# Patient Record
Sex: FEMALE | Race: WHITE | NOT HISPANIC OR LATINO | Employment: UNEMPLOYED | URBAN - METROPOLITAN AREA
[De-identification: names, ages, dates, MRNs, and addresses within clinical notes are randomized per-mention and may not be internally consistent; named-entity substitution may affect disease eponyms.]

---

## 2017-02-07 ENCOUNTER — HOSPITAL ENCOUNTER (EMERGENCY)
Facility: HOSPITAL | Age: 22
Discharge: HOME | End: 2017-02-07
Attending: EMERGENCY MEDICINE | Admitting: EMERGENCY MEDICINE

## 2017-02-07 VITALS
TEMPERATURE: 97 F | BODY MASS INDEX: 18.83 KG/M2 | WEIGHT: 120 LBS | OXYGEN SATURATION: 98 % | HEIGHT: 67 IN | SYSTOLIC BLOOD PRESSURE: 124 MMHG | RESPIRATION RATE: 18 BRPM | DIASTOLIC BLOOD PRESSURE: 76 MMHG | HEART RATE: 113 BPM

## 2017-02-07 DIAGNOSIS — Z77.098 CHEMICAL EXPOSURE OF EYE: Primary | ICD-10-CM

## 2017-02-07 PROCEDURE — 99283 EMERGENCY DEPT VISIT LOW MDM: CPT

## 2017-02-07 NOTE — ED PROVIDER NOTES
Subjective   HPI Comments: Patient was working in an art room using copper acid.  She states she got a small amount in the medial canthus of her left eye.  Quickly she irrigated it and then took a shower and irrigated more.  Since that time she's had a little bit of irritation and wanted to come get this checked out.  She denies any visual disturbance and states that the pain is actually improved.  She wears distance vision glasses and recently had an eye exam.  No other complaints.    Patient is a 21 y.o. female presenting with eye problem.   History provided by:  Patient  Eye Problem   Location:  Left eye  Quality:  Dull  Severity:  Mild  Onset quality:  Sudden  Timing:  Intermittent  Progression:  Resolved  Chronicity:  New  Context: chemical exposure    Relieved by:  Flushing  Worsened by:  Nothing  Associated symptoms: no blurred vision, no double vision, no headaches, no scotomas and no vomiting        Review of Systems   Eyes: Negative for blurred vision and double vision.   Gastrointestinal: Negative for vomiting.   Neurological: Negative for headaches.   All other systems reviewed and are negative.      History reviewed. No pertinent past medical history.    No Known Allergies    History reviewed. No pertinent past surgical history.    History reviewed. No pertinent family history.    Social History     Social History   • Marital status: Single     Spouse name: N/A   • Number of children: N/A   • Years of education: N/A     Social History Main Topics   • Smoking status: Never Smoker   • Smokeless tobacco: None   • Alcohol use Yes      Comment: occasional   • Drug use: No   • Sexual activity: Not Asked     Other Topics Concern   • None     Social History Narrative   • None           Objective   Physical Exam   Constitutional: She appears well-developed and well-nourished.   HENT:   Head: Normocephalic and atraumatic.   Eyes: EOM are normal. Pupils are equal, round, and reactive to light.   All of redness to  the medial canthus of the left eye.  It appears that in this area, the bulbar conjunctiva is red.  All the vessels around the eye including the this is normal.  Visual acuity were reviewed by me.   Neurological: She is alert.   Skin: Skin is warm and dry.   Psychiatric: She has a normal mood and affect. Her behavior is normal. Judgment and thought content normal.   Nursing note and vitals reviewed.      Procedures         ED Course  ED Course   Comment By Time   She was irrigated with 500 ML normal saline here.  She had no additional complaints. ALINA Kay 02/07 0313                  [facility]    Final diagnoses:   Chemical exposure of eye            ALINA Kay  02/07/17 0314

## 2018-06-13 ENCOUNTER — OFFICE VISIT (OUTPATIENT)
Dept: FAMILY MEDICINE CLINIC | Facility: CLINIC | Age: 23
End: 2018-06-13

## 2018-06-13 VITALS
OXYGEN SATURATION: 98 % | WEIGHT: 120 LBS | BODY MASS INDEX: 18.83 KG/M2 | HEART RATE: 92 BPM | HEIGHT: 67 IN | SYSTOLIC BLOOD PRESSURE: 98 MMHG | DIASTOLIC BLOOD PRESSURE: 58 MMHG

## 2018-06-13 DIAGNOSIS — F41.9 ANXIETY: Primary | ICD-10-CM

## 2018-06-13 DIAGNOSIS — Z83.2 FAMILY HISTORY OF PROTEIN C DEFICIENCY: ICD-10-CM

## 2018-06-13 DIAGNOSIS — Z83.2 FAMILY HISTORY OF FACTOR V LEIDEN MUTATION: ICD-10-CM

## 2018-06-13 DIAGNOSIS — R53.83 OTHER FATIGUE: ICD-10-CM

## 2018-06-13 LAB
HCT VFR BLDA CALC: 41.2 %
HGB BLDA-MCNC: 13.7 G/DL
MCH, POC: 31
MCHC, POC: 33.2
MCV, POC: 93.5
PLATELET # BLD AUTO: 306 10*3/MM3
PMV BLD: 8.7 FL
RBC, POC: 4.41
RDW, POC: 13.7
WBC # BLD: 9.8 10*3/UL

## 2018-06-13 PROCEDURE — 85027 COMPLETE CBC AUTOMATED: CPT | Performed by: FAMILY MEDICINE

## 2018-06-13 PROCEDURE — 99203 OFFICE O/P NEW LOW 30 MIN: CPT | Performed by: FAMILY MEDICINE

## 2018-06-13 RX ORDER — NORETHINDRONE ACETATE AND ETHINYL ESTRADIOL, ETHINYL ESTRADIOL AND FERROUS FUMARATE 1MG-10(24)
KIT ORAL
COMMUNITY
Start: 2018-05-16 | End: 2018-07-16

## 2018-06-13 NOTE — PROGRESS NOTES
Subjective   Elyssa Santoyo is a 23 y.o. female. Presents today for   Chief Complaint   Patient presents with   • Establish Care   • Tingling       Lower Extremity Issue   This is a new (Reports tingling and numbness in finger tips and toes.) problem. Episode onset: Started end of April 2018. The problem occurs intermittently (Can last for several hours.). The problem has been waxing and waning. Associated symptoms include fatigue and numbness. Pertinent negatives include no abdominal pain, arthralgias (No back pain), chest pain, fever, joint swelling, nausea or weakness. Associated symptoms comments: Hx of anxiety, reports when anxious about worse, but not necessarily associated with having anxiety.. Nothing aggravates the symptoms. Treatments tried: walking around improved numbness in toes. Improvement on treatment: Other than walking around, has not tried anything or had prior work-up.   Anxiety   Presents for initial visit. Episode onset: 1.5 years ago. The problem has been waxing and waning. Symptoms include dizziness, excessive worry, muscle tension, nervous/anxious behavior, palpitations (only with panic attacks) and panic. Patient reports no chest pain, depressed mood, nausea, shortness of breath or suicidal ideas. Primary symptoms comment: Reports moved to [place], feels more anxious when in [place] as alone.. Symptoms occur most days. The symptoms are aggravated by work stress. The quality of sleep is fair. Nighttime awakenings: occasional.     There is no history of anemia, arrhythmia, asthma, bipolar disorder, CAD, CHF, depression, fibromyalgia, hyperthyroidism or suicide attempts. Treatments tried: Last primary suggested zyrtec at bedtime. The treatment provided no relief. Compliance with prior treatments has been good.     Father patient here and has Factor V leiden deficiency and protein C def, needs screened.  No personal history of DVT/VTE.    Review of Systems   Constitutional: Positive for  "fatigue. Negative for fever.   Respiratory: Negative for shortness of breath.    Cardiovascular: Positive for palpitations (only with panic attacks). Negative for chest pain.   Gastrointestinal: Negative for abdominal pain and nausea.   Musculoskeletal: Negative for arthralgias (No back pain) and joint swelling.   Neurological: Positive for dizziness and numbness. Negative for weakness.   Psychiatric/Behavioral: Negative for suicidal ideas. The patient is nervous/anxious.        The following portions of the patient's history were reviewed and updated as appropriate: allergies, current medications, past family history, past medical history, past social history, past surgical history and problem list.    Patient Active Problem List   Diagnosis   • Anxiety       No Known Allergies    No current outpatient prescriptions on file prior to visit.     No current facility-administered medications on file prior to visit.        Objective   Vitals:    06/13/18 1021   BP: 98/58   BP Location: Right arm   Patient Position: Sitting   Cuff Size: Adult   Pulse: 92   SpO2: 98%   Weight: 54.4 kg (120 lb)   Height: 170.2 cm (67\")       Physical Exam   Constitutional: She appears well-developed and well-nourished.   HENT:   Head: Normocephalic and atraumatic.   Neck: Neck supple. No JVD present. No thyromegaly present.   Cardiovascular: Normal rate, regular rhythm and normal heart sounds.  Exam reveals no gallop and no friction rub.    No murmur heard.  Pulmonary/Chest: Effort normal and breath sounds normal. No respiratory distress. She has no wheezes. She has no rales.   Abdominal: Soft. Bowel sounds are normal. She exhibits no distension. There is no tenderness. There is no rebound and no guarding.   Musculoskeletal: She exhibits no edema.   Neurological: She is alert.   Skin: Skin is warm and dry.   Psychiatric: Her behavior is normal. Her mood appears anxious.   Nursing note and vitals reviewed.    CBC ordered and " reviewed.    Assessment/Plan   Elyssa was seen today for establish care and tingling.    Diagnoses and all orders for this visit:    Anxiety    Family history of factor V Leiden mutation  -     Factor 5 Leiden  -     Protein C & S Antigens  -     Protein C & S, Functional    Other fatigue  -     Factor 5 Leiden  -     Protein C & S Antigens  -     Protein C & S, Functional  -     Comprehensive Metabolic Panel  -     TSH  -     Vitamin B12  -     POC CBC    Family history of protein C deficiency  -     Factor 5 Leiden  -     Protein C & S Antigens  -     Protein C & S, Functional    -I d/w tx for anxiety at length and will consider sertraline, warned of increase in anxiety when 1st starts;  Could try atarax prn in beginning for panic attacks;  D/w after doing well, could consider coming off.  She will d/w her Mother than call for Rx if wants.  D/w will need to est with PCP as plans to live in [place] for next 2 years.  -fatigue - check labs as noted above;  Reports fingertip and toe tingling occly, ? b12 def, will check  -Father patient here and has low protein C and factor V leiden mutation, patient will be screened today         -Follow up: 4 weeks       Current Outpatient Prescriptions:   •  LO LOESTRIN FE 1 MG-10 MCG / 10 MCG tablet, , Disp: , Rfl:

## 2018-06-19 LAB
ALBUMIN SERPL-MCNC: 4.4 G/DL (ref 3.5–5.5)
ALBUMIN/GLOB SERPL: 1.5 {RATIO} (ref 1.2–2.2)
ALP SERPL-CCNC: 44 IU/L (ref 39–117)
ALT SERPL-CCNC: 9 IU/L (ref 0–32)
AST SERPL-CCNC: 16 IU/L (ref 0–40)
BILIRUB SERPL-MCNC: 1 MG/DL (ref 0–1.2)
BUN SERPL-MCNC: 10 MG/DL (ref 6–20)
BUN/CREAT SERPL: 14 (ref 9–23)
CALCIUM SERPL-MCNC: 9.5 MG/DL (ref 8.7–10.2)
CHLORIDE SERPL-SCNC: 102 MMOL/L (ref 96–106)
CO2 SERPL-SCNC: 22 MMOL/L (ref 20–29)
CREAT SERPL-MCNC: 0.71 MG/DL (ref 0.57–1)
F5 GENE MUT ANL BLD/T: ABNORMAL
GFR SERPLBLD CREATININE-BSD FMLA CKD-EPI: 120 ML/MIN/1.73
GFR SERPLBLD CREATININE-BSD FMLA CKD-EPI: 139 ML/MIN/1.73
GLOBULIN SER CALC-MCNC: 2.9 G/DL (ref 1.5–4.5)
GLUCOSE SERPL-MCNC: 85 MG/DL (ref 65–99)
POTASSIUM SERPL-SCNC: 4.9 MMOL/L (ref 3.5–5.2)
PROT C ACT/NOR PPP: 137 % (ref 73–180)
PROT C AG ACT/NOR PPP IA: 116 % (ref 60–150)
PROT S ACT/NOR PPP: 59 % (ref 63–140)
PROT S AG ACT/NOR PPP IA: 36 % (ref 60–150)
PROT S FREE AG ACT/NOR PPP IA: 71 % (ref 57–157)
PROT SERPL-MCNC: 7.3 G/DL (ref 6–8.5)
SODIUM SERPL-SCNC: 138 MMOL/L (ref 134–144)
TSH SERPL DL<=0.005 MIU/L-ACNC: 2.05 UIU/ML (ref 0.45–4.5)
VIT B12 SERPL-MCNC: 325 PG/ML (ref 232–1245)

## 2018-06-20 DIAGNOSIS — D68.51 FACTOR 5 LEIDEN MUTATION, HETEROZYGOUS (HCC): Primary | ICD-10-CM

## 2018-06-20 NOTE — PROGRESS NOTES
Call results to patient.  A patient is + for factor V leiden mutation and protein S deficiency (mild).  I am going to refer to hematology for counseling on future thrombotic risk and long-term planning.  Her Father has had recurrent blood clots and Factor V Leiden + as well.  ALso to give advice on continuing OCP as places at risk blood clots.  Rest of labs are normal.

## 2018-07-16 ENCOUNTER — OFFICE VISIT (OUTPATIENT)
Dept: FAMILY MEDICINE CLINIC | Facility: CLINIC | Age: 23
End: 2018-07-16

## 2018-07-16 VITALS
HEIGHT: 67 IN | DIASTOLIC BLOOD PRESSURE: 62 MMHG | TEMPERATURE: 98.4 F | BODY MASS INDEX: 18.83 KG/M2 | HEART RATE: 80 BPM | OXYGEN SATURATION: 98 % | SYSTOLIC BLOOD PRESSURE: 102 MMHG | WEIGHT: 120 LBS

## 2018-07-16 DIAGNOSIS — D68.51 FACTOR 5 LEIDEN MUTATION, HETEROZYGOUS (HCC): Primary | ICD-10-CM

## 2018-07-16 PROCEDURE — 99213 OFFICE O/P EST LOW 20 MIN: CPT | Performed by: FAMILY MEDICINE

## 2018-07-16 NOTE — PROGRESS NOTES
"Subjective   Elyssa Santoyo is a 23 y.o. female. Presents today for   Chief Complaint   Patient presents with   • Follow-up     pt here for 6 week follow up appt, she would like to discuss her lab results.       History of Present Illness  Patient was heterozygous + for Factor V leiden.  She has never had prior DVT/VTE.  She was on OCP, but has come off now since testing back.  Currently denies cp/soa;  No palpitations;  No calf or leg pain;  No lower extremity swelling.  She has upcoming flight to [place], which is 17 hours.  She has made this trip several times in past.  Her Father is a patient here who also was + for Factor V Leiden, he has had 2 unprovoked DVTs and currently on life long anticoagulation.  Her sister was just tested and negative.    Review of Systems   Respiratory: Negative for shortness of breath.    Cardiovascular: Negative for chest pain, palpitations and leg swelling.   Neurological: Negative for syncope.       Patient Active Problem List   Diagnosis   • Anxiety   • Factor 5 Leiden mutation, heterozygous (CMS/[facility])       Social History     Social History   • Marital status: Single   • Years of education: College     Occupational History   •  Unemployed     Social History Main Topics   • Smoking status: Never Smoker   • Smokeless tobacco: Never Used   • Alcohol use 1.8 - 2.4 oz/week     3 Glasses of wine per week      Comment: occasional   • Drug use: No     Other Topics Concern   • Not on file       No Known Allergies    Current Outpatient Prescriptions on File Prior to Visit   Medication Sig Dispense Refill   • [DISCONTINUED] LO LOESTRIN FE 1 MG-10 MCG / 10 MCG tablet        No current facility-administered medications on file prior to visit.        Objective   Vitals:    07/16/18 1322   BP: 102/62   BP Location: Right arm   Patient Position: Sitting   Cuff Size: Adult   Pulse: 80   Temp: 98.4 °F (36.9 °C)   TempSrc: Oral   SpO2: 98%   Weight: 54.4 kg (120 lb)   Height: 170.2 cm (67.01\") "       Physical Exam   Constitutional: She appears well-developed and well-nourished.   HENT:   Head: Normocephalic and atraumatic.   Neck: Neck supple. No JVD present. No thyromegaly present.   Cardiovascular: Normal rate, regular rhythm and normal heart sounds.  Exam reveals no gallop and no friction rub.    No murmur heard.  Pulmonary/Chest: Effort normal and breath sounds normal. No respiratory distress. She has no wheezes. She has no rales.   Abdominal: Soft. Bowel sounds are normal. She exhibits no distension. There is no tenderness. There is no rebound and no guarding.   Musculoskeletal: She exhibits no edema.   Neurological: She is alert.   Skin: Skin is warm and dry.   Psychiatric: She has a normal mood and affect. Her behavior is normal.   Nursing note and vitals reviewed.      Assessment/Plan   Elyssa was seen today for follow-up.    Diagnoses and all orders for this visit:    Factor 5 Leiden mutation, heterozygous (CMS/HCC)    -I gave patient hand out on Factor 5 Leiden and educational information.  Recommend stay off OCP (estrogen products).  Try to move around on plane as able, wear compression hose as precautionary.  I did refer to hematology so could review with patient, future risks and better answer her questions.  -She saw me for anxiety, but relates at this time doing okay and doesn't desire treatment.       -Follow up: Prn - RTC if worse or no improvement.

## 2018-07-30 ENCOUNTER — CONSULT (OUTPATIENT)
Dept: ONCOLOGY | Facility: CLINIC | Age: 23
End: 2018-07-30

## 2018-07-30 ENCOUNTER — LAB (OUTPATIENT)
Dept: LAB | Facility: HOSPITAL | Age: 23
End: 2018-07-30

## 2018-07-30 VITALS
HEIGHT: 66 IN | OXYGEN SATURATION: 100 % | HEART RATE: 87 BPM | RESPIRATION RATE: 16 BRPM | BODY MASS INDEX: 19.35 KG/M2 | TEMPERATURE: 97.8 F | WEIGHT: 120.4 LBS | SYSTOLIC BLOOD PRESSURE: 102 MMHG | DIASTOLIC BLOOD PRESSURE: 64 MMHG

## 2018-07-30 DIAGNOSIS — D68.51 FACTOR 5 LEIDEN MUTATION, HETEROZYGOUS (HCC): Primary | ICD-10-CM

## 2018-07-30 LAB
ALBUMIN SERPL-MCNC: 4.1 G/DL (ref 3.5–5.2)
ALBUMIN/GLOB SERPL: 1.3 G/DL (ref 1.1–2.4)
ALP SERPL-CCNC: 50 U/L (ref 38–116)
ALT SERPL W P-5'-P-CCNC: 18 U/L (ref 0–33)
ANION GAP SERPL CALCULATED.3IONS-SCNC: 12.5 MMOL/L
AST SERPL-CCNC: 27 U/L (ref 0–32)
BASOPHILS # BLD AUTO: 0.05 10*3/MM3 (ref 0–0.1)
BASOPHILS NFR BLD AUTO: 0.5 % (ref 0–1.1)
BILIRUB SERPL-MCNC: 1.5 MG/DL (ref 0.1–1.2)
BUN BLD-MCNC: 12 MG/DL (ref 6–20)
BUN/CREAT SERPL: 19.7 (ref 7.3–30)
CALCIUM SPEC-SCNC: 9.2 MG/DL (ref 8.5–10.2)
CHLORIDE SERPL-SCNC: 103 MMOL/L (ref 98–107)
CO2 SERPL-SCNC: 24.5 MMOL/L (ref 22–29)
CREAT BLD-MCNC: 0.61 MG/DL (ref 0.6–1.1)
DEPRECATED RDW RBC AUTO: 44.3 FL (ref 37–49)
EOSINOPHIL # BLD AUTO: 0.19 10*3/MM3 (ref 0–0.36)
EOSINOPHIL NFR BLD AUTO: 2 % (ref 1–5)
ERYTHROCYTE [DISTWIDTH] IN BLOOD BY AUTOMATED COUNT: 13.1 % (ref 11.7–14.5)
GFR SERPL CREATININE-BSD FRML MDRD: 122 ML/MIN/1.73
GLOBULIN UR ELPH-MCNC: 3.1 GM/DL (ref 1.8–3.5)
GLUCOSE BLD-MCNC: 85 MG/DL (ref 74–124)
HCT VFR BLD AUTO: 42.2 % (ref 34–45)
HGB BLD-MCNC: 13.8 G/DL (ref 11.5–14.9)
IMM GRANULOCYTES # BLD: 0.03 10*3/MM3 (ref 0–0.03)
IMM GRANULOCYTES NFR BLD: 0.3 % (ref 0–0.5)
LYMPHOCYTES # BLD AUTO: 4.4 10*3/MM3 (ref 1–3.5)
LYMPHOCYTES NFR BLD AUTO: 46.7 % (ref 20–49)
MCH RBC QN AUTO: 30.4 PG (ref 27–33)
MCHC RBC AUTO-ENTMCNC: 32.7 G/DL (ref 32–35)
MCV RBC AUTO: 93 FL (ref 83–97)
MONOCYTES # BLD AUTO: 1.07 10*3/MM3 (ref 0.25–0.8)
MONOCYTES NFR BLD AUTO: 11.4 % (ref 4–12)
NEUTROPHILS # BLD AUTO: 3.68 10*3/MM3 (ref 1.5–7)
NEUTROPHILS NFR BLD AUTO: 39.1 % (ref 39–75)
NRBC BLD MANUAL-RTO: 0 /100 WBC (ref 0–0)
PLATELET # BLD AUTO: 240 10*3/MM3 (ref 150–375)
PMV BLD AUTO: 10.7 FL (ref 8.9–12.1)
POTASSIUM BLD-SCNC: 3.7 MMOL/L (ref 3.5–4.7)
PROT SERPL-MCNC: 7.2 G/DL (ref 6.3–8)
RBC # BLD AUTO: 4.54 10*6/MM3 (ref 3.9–5)
SODIUM BLD-SCNC: 140 MMOL/L (ref 134–145)
WBC NRBC COR # BLD: 9.42 10*3/MM3 (ref 4–10)

## 2018-07-30 PROCEDURE — 36416 COLLJ CAPILLARY BLOOD SPEC: CPT | Performed by: INTERNAL MEDICINE

## 2018-07-30 PROCEDURE — 36415 COLL VENOUS BLD VENIPUNCTURE: CPT | Performed by: INTERNAL MEDICINE

## 2018-07-30 PROCEDURE — 99204 OFFICE O/P NEW MOD 45 MIN: CPT | Performed by: INTERNAL MEDICINE

## 2018-07-30 PROCEDURE — 80053 COMPREHEN METABOLIC PANEL: CPT | Performed by: INTERNAL MEDICINE

## 2018-07-30 PROCEDURE — 85306 CLOT INHIBIT PROT S FREE: CPT | Performed by: INTERNAL MEDICINE

## 2018-07-30 PROCEDURE — 85025 COMPLETE CBC W/AUTO DIFF WBC: CPT | Performed by: INTERNAL MEDICINE

## 2018-07-30 NOTE — PROGRESS NOTES
Subjective feels well, discussed findings for hypercoagulable state    REASON FOR CONSULTATION:  Factor V 5 Leiden mutation  Provide an opinion on any further workup or treatment                             REQUESTING PHYSICIAN:  Raji Canales DO    RECORDS OBTAINED:  Records of the patients history including those obtained from the referring provider were reviewed and summarized in detail.    HISTORY OF PRESENT ILLNESS:  The patient is a 23 y.o. year old female who is here for an opinion about the above issue.    History of Present Illness         The patient's 23-year-old female resting see with a recently recognized heterozygosity for factor V Leiden.  Previous history includes assessment for numbness in fingers and toes in April, anxiety and consideration of using a trial of Zoloft.  It is become notable that her father, also followed in our practice, has a history of factor V Leiden mutation.  The patient's father assessment included a hypercoagulable assessment has since been ongoing. This, unfortunately, reflects his continued warfarin use with low protein S free levels at 52%, total protein S at 83%, protein S functional low at 43% and MTHFR which reveals heterozygosity for the C677T variant (1 copy) AT3 activity level 115% and AT3 antigen at 112%. Protein C is similarly reduced consistent with warfarin use. The factor V Leiden assessment   r  eveals a single mutation (R506q) prothrombin gene mutation negative is negative. It was also recognized that the patient has elevated cholesterol at 233 with triglycerides at 173.  This patient's assessment obtained June 13 was significant for single R506Q mutation (heterozygosity) for factor V Leiden as well, protein S antigen of 36, protein S antigen free of 71, protein S functional of 59.  Additional tests included TSH of 2.05, B12 of 325.  As result the patient's positive findings for factor V Leiden and apparent protein S deficiency she is now seen for  consultation.   In discussing risk factors for hypercoagulable state the patient fortunately has never had thrombosis nor injury to extremity.  She did use birth control pills for approximately 7 years and stopped them when she was found to be positive for heterozygosity for factor V Leiden.  This was approximately 2 weeks ago and in the last week or so she's had placement of an IUD nonhormonal.  This she is also tolerated fairly well.  Finally she indicates that she does travel rather extensively in several trips to [place] on at least 4 occasions which is 20 hour plus trip.  Finally her sister was tested and found to have some variability as the patient herself with with protein S though her sister was negative for factor V Leiden Leiden mutation.  The patient does plan in September another trip to [place] will likely be staying there possibly for several years.    Past Medical History:   Diagnosis Date   • Anxiety    • Factor V Leiden mutation (CMS/[facility]) 2018   • GERD (gastroesophageal reflux disease)    • H/O Pink eye    • History of migraines         Past Surgical History:   Procedure Laterality Date   • NO PAST SURGERIES          No current outpatient prescriptions on file prior to visit.     No current facility-administered medications on file prior to visit.         ALLERGIES:  No Known Allergies     Social History     Social History   • Marital status: Single   • Years of education: College     Occupational History   •  Unemployed     Social History Main Topics   • Smoking status: Never Smoker   • Smokeless tobacco: Never Used   • Alcohol use 1.8 - 2.4 oz/week     3 Glasses of wine per week      Comment: occasional   • Drug use: No     Other Topics Concern   • Not on file        Family History   Problem Relation Age of Onset   • Factor V Leiden deficiency Father    • Deep vein thrombosis Father    • Pulmonary embolism Father    • Arrhythmia Father    • Cancer Maternal Grandfather         Review of  "Systems   Constitutional: Negative.    HENT: Negative.    Eyes: Negative.    Respiratory: Negative.    Cardiovascular: Negative.    Gastrointestinal: Positive for abdominal pain.   Endocrine: Negative.    Genitourinary: Negative.    Musculoskeletal: Negative.    Skin:        Positive for periodic pruritus   Allergic/Immunologic: Negative.    Neurological: Negative.    Hematological: Negative.    Psychiatric/Behavioral: Negative.        Objective     Vitals:    07/30/18 1018   BP: 102/64   Pulse: 87   Resp: 16   Temp: 97.8 °F (36.6 °C)   TempSrc: Oral   SpO2: 100%   Weight: 54.6 kg (120 lb 6.4 oz)   Height: 168.5 cm (66.34\")   PainSc: 0-No pain  Comment: Headaches x1 week.     Current Status 7/30/2018   ECOG score 0       Physical Exam   Constitutional: She is oriented to person, place, and time. She appears well-developed and well-nourished.   HENT:   Head: Normocephalic and atraumatic.   Eyes: Pupils are equal, round, and reactive to light. Conjunctivae and EOM are normal.   Neck: Normal range of motion.   Cardiovascular: Normal rate, regular rhythm, normal heart sounds and intact distal pulses.    Pulmonary/Chest: Effort normal and breath sounds normal.   Abdominal: Soft. Bowel sounds are normal.   Musculoskeletal: Normal range of motion.   Neurological: She is alert and oriented to person, place, and time.   Skin: Skin is warm and dry.   Psychiatric: She has a normal mood and affect. Her behavior is normal. Judgment and thought content normal.        RECENT LABS:  Hematology WBC   Date Value Ref Range Status   07/30/2018 9.42 4.00 - 10.00 10*3/mm3 Final     RBC   Date Value Ref Range Status   07/30/2018 4.54 3.90 - 5.00 10*6/mm3 Final   06/13/2018 4.41  Final     Hemoglobin   Date Value Ref Range Status   07/30/2018 13.8 11.5 - 14.9 g/dL Final     Hematocrit   Date Value Ref Range Status   07/30/2018 42.2 34.0 - 45.0 % Final     Platelets   Date Value Ref Range Status   07/30/2018 240 150 - 375 10*3/mm3 Final    "       Assessment/Plan        23-year-old female with little past medical history except for anxiety with recent findings of heterozygosity for factor V Leiden.  The patient's father is followed in our practice with this finding as well as finding heterozygosity for MTHFR, mildly elevated homocysteine and variability protein C&S thought to be related to his warfarin use.  The patient's sister was also found to have mildly low levels of protein S as well though off of any warfarin.      The patient herself has been found to have low levels of protein S as well as heterozygosity factor V 5 Leiden and did not, unfortunately, develop thrombosis despite being on BCPs for many years and also having extremely long airplane trips during which she was somewhat immobilized but did walk, as much as possible, on the plane when she noticed aching in her legs.  We have discussed these findings as well as spoken to her sister and father during the visit.  At this point we plan:  *Recheck Protein  S levels both total, free and functional  *Plasma homocystine level and CMP  *Follow-up visit in approximately 2 weeks with the patient planning a prolonged trip in September  *No hormonal birth control can be recommended from this point  *Prophylaxis with an agent such as Xarelto 10 mg prior to her prolonged airplane trips is being considered.

## 2018-07-31 LAB
HCYS SERPL-SCNC: 13.6 UMOL/L (ref 0–15)
PROT S FREE PPP-ACNC: 73 % (ref 57–157)
PROT S PPP-ACNC: 46 % (ref 60–150)

## 2018-08-03 LAB — PROT S ACT/NOR PPP: 58 % (ref 70–127)

## 2018-08-14 ENCOUNTER — OFFICE VISIT (OUTPATIENT)
Dept: ONCOLOGY | Facility: CLINIC | Age: 23
End: 2018-08-14

## 2018-08-14 ENCOUNTER — LAB (OUTPATIENT)
Dept: LAB | Facility: HOSPITAL | Age: 23
End: 2018-08-14

## 2018-08-14 VITALS
DIASTOLIC BLOOD PRESSURE: 70 MMHG | HEART RATE: 79 BPM | OXYGEN SATURATION: 100 % | TEMPERATURE: 98.2 F | WEIGHT: 118.2 LBS | BODY MASS INDEX: 18.99 KG/M2 | HEIGHT: 66 IN | RESPIRATION RATE: 12 BRPM | SYSTOLIC BLOOD PRESSURE: 104 MMHG

## 2018-08-14 DIAGNOSIS — D68.51 FACTOR 5 LEIDEN MUTATION, HETEROZYGOUS (HCC): Primary | ICD-10-CM

## 2018-08-14 LAB
BASOPHILS # BLD AUTO: 0.03 10*3/MM3 (ref 0–0.1)
BASOPHILS NFR BLD AUTO: 0.4 % (ref 0–1.1)
DEPRECATED RDW RBC AUTO: 44.9 FL (ref 37–49)
EOSINOPHIL # BLD AUTO: 0.23 10*3/MM3 (ref 0–0.36)
EOSINOPHIL NFR BLD AUTO: 3.4 % (ref 1–5)
ERYTHROCYTE [DISTWIDTH] IN BLOOD BY AUTOMATED COUNT: 13.3 % (ref 11.7–14.5)
HCT VFR BLD AUTO: 42.6 % (ref 34–45)
HGB BLD-MCNC: 14.2 G/DL (ref 11.5–14.9)
IMM GRANULOCYTES # BLD: 0.03 10*3/MM3 (ref 0–0.03)
IMM GRANULOCYTES NFR BLD: 0.4 % (ref 0–0.5)
LYMPHOCYTES # BLD AUTO: 3.55 10*3/MM3 (ref 1–3.5)
LYMPHOCYTES NFR BLD AUTO: 52.4 % (ref 20–49)
MCH RBC QN AUTO: 30.7 PG (ref 27–33)
MCHC RBC AUTO-ENTMCNC: 33.3 G/DL (ref 32–35)
MCV RBC AUTO: 92 FL (ref 83–97)
MONOCYTES # BLD AUTO: 0.66 10*3/MM3 (ref 0.25–0.8)
MONOCYTES NFR BLD AUTO: 9.7 % (ref 4–12)
NEUTROPHILS # BLD AUTO: 2.28 10*3/MM3 (ref 1.5–7)
NEUTROPHILS NFR BLD AUTO: 33.7 % (ref 39–75)
NRBC BLD MANUAL-RTO: 0 /100 WBC (ref 0–0)
PLATELET # BLD AUTO: 313 10*3/MM3 (ref 150–375)
PMV BLD AUTO: 11 FL (ref 8.9–12.1)
RBC # BLD AUTO: 4.63 10*6/MM3 (ref 3.9–5)
WBC NRBC COR # BLD: 6.78 10*3/MM3 (ref 4–10)

## 2018-08-14 PROCEDURE — 85025 COMPLETE CBC W/AUTO DIFF WBC: CPT | Performed by: INTERNAL MEDICINE

## 2018-08-14 PROCEDURE — 36415 COLL VENOUS BLD VENIPUNCTURE: CPT | Performed by: INTERNAL MEDICINE

## 2018-08-14 PROCEDURE — 99214 OFFICE O/P EST MOD 30 MIN: CPT | Performed by: INTERNAL MEDICINE

## 2018-08-14 NOTE — PROGRESS NOTES
Subjective feels well, discussed findings for hypercoagulable state    REASON FOR CONSULTATION:  Factor V 5 Leiden mutation  Provide an opinion on any further workup or treatment                             REQUESTING PHYSICIAN:  Raji Canales DO        History of Present Illness         The patient's 23-year-old female resting see with a recently recognized heterozygosity for factor V Leiden.  Previous history includes assessment for numbness in fingers and toes in April, anxiety and consideration of using a trial of Zoloft.  It is become notable that her father, also followed in our practice, has a history of factor V Leiden mutation.  The patient's father assessment included a hypercoagulable assessment has since been ongoing. This, unfortunately, reflects his continued warfarin use with low protein S free levels at 52%, total protein S at 83%, protein S functional low at 43% and MTHFR which reveals heterozygosity for the C677T variant (1 copy) AT3 activity level 115% and AT3 antigen at 112%. Protein C is similarly reduced consistent with warfarin use. The factor V Leiden assessment   r  eveals a single mutation (R506q) prothrombin gene mutation negative is negative. It was also recognized that the patient has elevated cholesterol at 233 with triglycerides at 173.  This patient's assessment obtained June 13 was significant for single R506Q mutation (heterozygosity) for factor V Leiden as well, protein S antigen of 36, protein S antigen free of 71, protein S functional of 59.  Additional tests included TSH of 2.05, B12 of 325.  As result the patient's positive findings for factor V Leiden and apparent protein S deficiency she is now seen for consultation.   In discussing risk factors for hypercoagulable state the patient fortunately has never had thrombosis nor injury to extremity.  She did use birth control pills for approximately 7 years and stopped them when she was found to be positive for heterozygosity  for factor V Leiden.  This was approximately 2 weeks ago and in the last week or so she's had placement of an IUD nonhormonal.  This she is also tolerated fairly well.  Finally she indicates that she does travel rather extensively in several trips to [place] on at least 4 occasions which is 20 hour plus trip.  Finally her sister was tested and found to have some variability as the patient herself with with protein S though her sister was negative for factor V Leiden Leiden mutation.  The patient does plan in September another trip to [place] will likely be staying there possibly for several years.      The patient's subsequent testing included a homocysteine of 13.6, protein S functional 58 (%), protein S antigen total of 46 (60-1 50%), protein S antigen free of 73% (%).  We have discussed at these levels do not appear to be terribly significant but at least part some concern in that prophylaxis could still be helpful for prolonged trips.                  Diagnosis Date   • Anxiety    • Factor V Leiden mutation (CMS/HCC) 2018   • GERD (gastroesophageal reflux disease)    • H/O foreign travel 01/2018    [place], [place]   • H/O Pink eye    • History of migraines         Past Surgical History:   Procedure Laterality Date   • NO PAST SURGERIES          Current Outpatient Prescriptions on File Prior to Visit   Medication Sig Dispense Refill   • Multiple Vitamins-Minerals (MULTIVITAMIN ADULTS PO) Take  by mouth.       No current facility-administered medications on file prior to visit.         ALLERGIES:  No Known Allergies     Social History     Social History   • Marital status: Single   • Years of education: College     Occupational History   •  [employer]     Social History Main Topics   • Smoking status: Never Smoker   • Smokeless tobacco: Never Used   • Alcohol use 1.8 - 2.4 oz/week     3 Glasses of wine per week      Comment: occasional   • Drug use: No     Other  Topics Concern   • Not on file        Family History   Problem Relation Age of Onset   • Factor V Leiden deficiency Father    • Deep vein thrombosis Father    • Pulmonary embolism Father    • Arrhythmia Father    • Cancer Maternal Grandfather         Review of Systems   Constitutional: Negative.    HENT: Negative.    Eyes: Negative.    Respiratory: Negative.    Cardiovascular: Negative.    Gastrointestinal: Positive for abdominal pain.   Endocrine: Negative.    Genitourinary: Negative.    Musculoskeletal: Negative.    Skin:        Positive for periodic pruritus   Allergic/Immunologic: Negative.    Neurological: Negative.    Hematological: Negative.    Psychiatric/Behavioral: Negative.        Objective     There were no vitals filed for this visit.  Current Status 7/30/2018   ECOG score 0       Physical Exam   Constitutional: She is oriented to person, place, and time. She appears well-developed and well-nourished.   HENT:   Head: Normocephalic and atraumatic.   Eyes: Pupils are equal, round, and reactive to light. Conjunctivae and EOM are normal.   Neck: Normal range of motion.   Cardiovascular: Normal rate, regular rhythm, normal heart sounds and intact distal pulses.    Pulmonary/Chest: Effort normal and breath sounds normal.   Abdominal: Soft. Bowel sounds are normal.   Musculoskeletal: Normal range of motion.   Neurological: She is alert and oriented to person, place, and time.   Skin: Skin is warm and dry.   Psychiatric: She has a normal mood and affect. Her behavior is normal. Judgment and thought content normal.        RECENT LABS:  Hematology WBC   Date Value Ref Range Status   07/30/2018 9.42 4.00 - 10.00 10*3/mm3 Final     RBC   Date Value Ref Range Status   07/30/2018 4.54 3.90 - 5.00 10*6/mm3 Final   06/13/2018 4.41  Final     Hemoglobin   Date Value Ref Range Status   07/30/2018 13.8 11.5 - 14.9 g/dL Final     Hematocrit   Date Value Ref Range Status   07/30/2018 42.2 34.0 - 45.0 % Final     Platelets  "  Date Value Ref Range Status   07/30/2018 240 150 - 375 10*3/mm3 Final          Assessment/Plan        23-year-old female with little past medical history except for anxiety with recent findings of heterozygosity for factor V Leiden.  The patient's father is followed in our practice with this finding as well as finding heterozygosity for MTHFR, mildly elevated homocysteine and variability protein C&S thought to be related to his warfarin use.  The patient's sister was also found to have mildly low levels of protein S as well though off of any warfarin.      The patient herself has been found to have low levels of protein S as well as heterozygosity factor V 5 Leiden and did not, unfortunately, develop thrombosis despite being on BCPs for many years and also having extremely long airplane trips during which she was somewhat immobilized but did walk, as much as possible, on the plane when she noticed aching in her legs.  We have discussed these findings as well as spoken to her sister and father during the visit.  At this point we obtained protein S levels, plasma homocystine and now see her back in follow-up with the above results.  She still plans a prolonged trip to [place] and likely residing there for several years.  We have discussed prophylaxis for prolonged airplane trips or even prolonged car trips of more than 2 hours treated with prophylactic doses of Xarelto-10 mg.  Plan:  *Samples of Xarelto 10 mg tablets provided for patient.  The use of this will be very infrequent but prior to prolonged airline and car trips of greater than 2 hours when she cannot otherwise stop and walk for a brief period of time.  She will take the sample tablet 30 minutes prior to the trip beginning  *Compression stockings-equivalent to NATALIE to be used on all prolonged trips  *Patient aware of the use of prophylaxis perioperatively but will contact us as needed for questions through her family or through\" my chart\"   *She is " advised that she contact us anytime she needs to and/or I would see her if she is in town visiting.

## 2018-09-10 ENCOUNTER — TELEPHONE (OUTPATIENT)
Dept: ONCOLOGY | Facility: HOSPITAL | Age: 23
End: 2018-09-10

## 2018-09-10 NOTE — TELEPHONE ENCOUNTER
----- Message from Korin Hernandez sent at 9/10/2018  4:53 PM EDT -----  Contact: [phone]  Pt calling to see if ok for her to take dramamine with Xarelto?

## 2018-09-11 ENCOUNTER — OFFICE VISIT (OUTPATIENT)
Dept: FAMILY MEDICINE CLINIC | Facility: CLINIC | Age: 23
End: 2018-09-11

## 2018-09-11 VITALS
OXYGEN SATURATION: 100 % | HEIGHT: 66 IN | SYSTOLIC BLOOD PRESSURE: 118 MMHG | TEMPERATURE: 98.2 F | BODY MASS INDEX: 18.48 KG/M2 | DIASTOLIC BLOOD PRESSURE: 64 MMHG | HEART RATE: 112 BPM | WEIGHT: 115 LBS

## 2018-09-11 DIAGNOSIS — J01.10 ACUTE NON-RECURRENT FRONTAL SINUSITIS: Primary | ICD-10-CM

## 2018-09-11 PROCEDURE — 99213 OFFICE O/P EST LOW 20 MIN: CPT | Performed by: NURSE PRACTITIONER

## 2018-09-11 RX ORDER — AMOXICILLIN 500 MG/1
1000 CAPSULE ORAL 2 TIMES DAILY
Qty: 28 CAPSULE | Refills: 0 | Status: SHIPPED | OUTPATIENT
Start: 2018-09-11 | End: 2019-03-08

## 2018-09-11 NOTE — PROGRESS NOTES
"Subjective   Elyssa Santoyo is a 23 y.o. female who presents c/o sore throat, nasal congestion, pressure below eyes, headache x 2 days. Has 26 hour flight scheduled for tomorrow and worried needs an antibiotic.     History of Present Illness   Took aleve cold and sinus, some help with symptoms. Also using flonase to address symptoms. Does not get many sinus infections, perhaps 1/yr  The following portions of the patient's history were reviewed and updated as appropriate: allergies, current medications, past family history, past medical history, past social history, past surgical history and problem list.    Review of Systems   Constitutional: Negative for chills and fever.   HENT: Positive for congestion, ear pain, postnasal drip, sinus pressure and sore throat.    Respiratory: Positive for cough.    Cardiovascular: Negative.    Gastrointestinal: Negative.    Neurological: Positive for headache.   Psychiatric/Behavioral: Negative.      /64   Pulse 112   Temp 98.2 °F (36.8 °C) (Oral)   Ht 168.5 cm (66.34\")   Wt 52.2 kg (115 lb)   SpO2 100%   BMI 18.37 kg/m²     Objective   Physical Exam   Constitutional: She is oriented to person, place, and time.   HENT:   Right Ear: A middle ear effusion is present.   Left Ear: A middle ear effusion is present.   Nose: Mucosal edema and rhinorrhea present. Right sinus exhibits maxillary sinus tenderness. Left sinus exhibits maxillary sinus tenderness.   Mouth/Throat: Uvula is midline and oropharynx is clear and moist.   Neck: Normal range of motion. Neck supple. No tracheal deviation present. No thyromegaly present.   Cardiovascular: Normal rate, regular rhythm and normal heart sounds.    Pulmonary/Chest: Effort normal and breath sounds normal.   Lymphadenopathy:     She has no cervical adenopathy.   Neurological: She is alert and oriented to person, place, and time.   Skin: Skin is warm and dry. Capillary refill takes less than 2 seconds.   Psychiatric: She has a normal " mood and affect. Her behavior is normal. Judgment and thought content normal.   Nursing note and vitals reviewed.    Assessment/Plan   Problems Addressed this Visit     None      Visit Diagnoses     Acute non-recurrent frontal sinusitis    -  Primary        Will start amoxil in 24 hours if not improved. Continue symptom controllers. FU if not settling.

## 2019-03-08 ENCOUNTER — OFFICE VISIT (OUTPATIENT)
Dept: FAMILY MEDICINE CLINIC | Facility: CLINIC | Age: 24
End: 2019-03-08

## 2019-03-08 VITALS
SYSTOLIC BLOOD PRESSURE: 100 MMHG | HEART RATE: 77 BPM | TEMPERATURE: 98.3 F | OXYGEN SATURATION: 98 % | WEIGHT: 122 LBS | BODY MASS INDEX: 19.49 KG/M2 | DIASTOLIC BLOOD PRESSURE: 60 MMHG

## 2019-03-08 DIAGNOSIS — M26.629 TMJ SYNDROME: Primary | ICD-10-CM

## 2019-03-08 DIAGNOSIS — M26.629 TMJ SYNDROME: ICD-10-CM

## 2019-03-08 PROCEDURE — 99213 OFFICE O/P EST LOW 20 MIN: CPT | Performed by: FAMILY MEDICINE

## 2019-03-08 RX ORDER — NAPROXEN 500 MG/1
500 TABLET ORAL 2 TIMES DAILY WITH MEALS
Qty: 60 TABLET | Refills: 1 | Status: SHIPPED | OUTPATIENT
Start: 2019-03-08 | End: 2019-03-08 | Stop reason: SDUPTHER

## 2019-03-08 RX ORDER — NAPROXEN 500 MG/1
TABLET ORAL
Qty: 60 TABLET | Refills: 1 | Status: SHIPPED | OUTPATIENT
Start: 2019-03-08 | End: 2019-11-25

## 2019-03-08 NOTE — PROGRESS NOTES
Subjective   Elyssa Santoyo is a 23 y.o. female. Presents today for   Chief Complaint   Patient presents with   • Follow-up     saw dentist yesterday and has a sore place behind her lt ear x 1 year off and on.       Jaw Pain   This is a chronic problem. The current episode started more than 1 year ago. The problem occurs intermittently. The problem has been waxing and waning. Associated symptoms include arthralgias. Pertinent negatives include no joint swelling. Associated symptoms comments: No swollen lymph nodes, just pain near tragus/angle of mandible;  Has popping and clicking at time.. Nothing aggravates the symptoms. She has tried nothing for the symptoms. The treatment provided no relief (Went to dentists x2 adn told teeth normal.).       Review of Systems   Musculoskeletal: Positive for arthralgias. Negative for joint swelling.       Patient Active Problem List   Diagnosis   • Anxiety   • Factor 5 Leiden mutation, heterozygous (CMS/HCC)       Social History     Socioeconomic History   • Marital status: Single     Spouse name: Not on file   • Number of children: Not on file   • Years of education: College   • Highest education level: Not on file   Occupational History   • Occupation: Social Media Manager     Employer: [employer]   Tobacco Use   • Smoking status: Never Smoker   • Smokeless tobacco: Never Used   Substance and Sexual Activity   • Alcohol use: Yes     Alcohol/week: 1.8 - 2.4 oz     Types: 3 Glasses of wine per week     Comment: occasional   • Drug use: No       No Known Allergies    Current Outpatient Medications on File Prior to Visit   Medication Sig Dispense Refill   • Multiple Vitamins-Minerals (MULTIVITAMIN ADULTS PO) Take  by mouth.     • rivaroxaban (XARELTO) 20 MG tablet Take 20 mg by mouth Daily. For use only when flying     • [DISCONTINUED] amoxicillin (AMOXIL) 500 MG capsule Take 2 capsules by mouth 2 (Two) Times a Day. 28 capsule 0     No current facility-administered medications  on file prior to visit.        Objective   Vitals:    03/08/19 0757   BP: 100/60   Pulse: 77   Temp: 98.3 °F (36.8 °C)   SpO2: 98%   Weight: 55.3 kg (122 lb)       Physical Exam   Constitutional: She is oriented to person, place, and time. She appears well-developed and well-nourished.   HENT:   B/l TMJ clicking/subluxing noted.   Lymphadenopathy:     She has no cervical adenopathy.   Neurological: She is alert and oriented to person, place, and time.   Skin: Skin is warm and dry.   Psychiatric: She has a normal mood and affect. Her behavior is normal.   Nursing note and vitals reviewed.      Assessment/Plan   Elyssa was seen today for follow-up.    Diagnoses and all orders for this visit:    TMJ syndrome  -     Discontinue: naproxen (NAPROSYN) 500 MG tablet; Take 1 tablet by mouth 2 (Two) Times a Day With Meals.    -naproxen take for few days, then stop and keep when flares;  Takign xarelto only for flight time, directed to not take anything other than tylenol while on due to bleed risk.  D/w pathophysiology;  Would avoid chewing gum, hard foods;  Try smaller bites.  Warm compress to jaw when flares.         -Follow up: Prn - RTC if worse or no improvement.

## 2019-03-08 NOTE — PATIENT INSTRUCTIONS
Temporomandibular Joint Syndrome  Temporomandibular joint (TMJ) syndrome is a condition that affects the joints between your jaw and your skull. The TMJs are located near your ears and allow your jaw to open and close. These joints and the nearby muscles are involved in all movements of the jaw. People with TMJ syndrome have pain in the area of these joints and muscles. Chewing, biting, or other movements of the jaw can be difficult or painful.  TMJ syndrome can be caused by various things. In many cases, the condition is mild and goes away within a few weeks. For some people, the condition can become a long-term problem.  What are the causes?  Possible causes of TMJ syndrome include:  · Grinding your teeth or clenching your jaw. Some people do this when they are under stress.  · Arthritis.  · Injury to the jaw.  · Head or neck injury.  · Teeth or dentures that are not aligned well.    In some cases, the cause of TMJ syndrome may not be known.  What are the signs or symptoms?  The most common symptom is an aching pain on the side of the head in the area of the TMJ. Other symptoms may include:  · Pain when moving your jaw, such as when chewing or biting.  · Being unable to open your jaw all the way.  · Making a clicking sound when you open your mouth.  · Headache.  · Earache.  · Neck or shoulder pain.    How is this diagnosed?  Diagnosis can usually be made based on your symptoms, your medical history, and a physical exam. Your health care provider may check the range of motion of your jaw. Imaging tests, such as X-rays or an MRI, are sometimes done. You may need to see your dentist to determine if your teeth and jaw are lined up correctly.  How is this treated?  TMJ syndrome often goes away on its own. If treatment is needed, the options may include:  · Eating soft foods and applying ice or heat.  · Medicines to relieve pain or inflammation.  · Medicines to relax the muscles.  · A splint, bite plate, or mouthpiece  to prevent teeth grinding or jaw clenching.  · Relaxation techniques or counseling to help reduce stress.  · Transcutaneous electrical nerve stimulation (TENS). This helps to relieve pain by applying an electrical current through the skin.  · Acupuncture. This is sometimes helpful to relieve pain.  · Jaw surgery. This is rarely needed.    Follow these instructions at home:  · Take medicines only as directed by your health care provider.  · Eat a soft diet if you are having trouble chewing.  · Apply ice to the painful area.  ? Put ice in a plastic bag.  ? Place a towel between your skin and the bag.  ? Leave the ice on for 20 minutes, 2-3 times a day.  · Apply a warm compress to the painful area as directed.  · Massage your jaw area and perform any jaw stretching exercises as recommended by your health care provider.  · If you were given a mouthpiece or bite plate, wear it as directed.  · Avoid foods that require a lot of chewing. Do not chew gum.  · Keep all follow-up visits as directed by your health care provider. This is important.  Contact a health care provider if:  · You are having trouble eating.  · You have new or worsening symptoms.  Get help right away if:  · Your jaw locks open or closed.  This information is not intended to replace advice given to you by your health care provider. Make sure you discuss any questions you have with your health care provider.  Document Released: 09/12/2002 Document Revised: 08/17/2017 Document Reviewed: 07/23/2015  [employer] Interactive Patient Education © 2018 [employer]

## 2019-10-23 ENCOUNTER — TELEPHONE (OUTPATIENT)
Dept: ONCOLOGY | Facility: HOSPITAL | Age: 24
End: 2019-10-23

## 2019-10-23 NOTE — TELEPHONE ENCOUNTER
Per Dr. Partida pt will take her Xarelto at 3am for a 7 am flight. Pt only takes Xarelto for flights. She will also wear her compression stockings. Pt V/U.

## 2019-10-23 NOTE — TELEPHONE ENCOUNTER
----- Message from Leeanne Tinsley sent at 10/23/2019  9:06 AM EDT -----  Contact: [phone]  Pt is flying tomorrow and wants to know about taking xarelto and drinking

## 2019-11-25 ENCOUNTER — OFFICE VISIT (OUTPATIENT)
Dept: FAMILY MEDICINE CLINIC | Facility: CLINIC | Age: 24
End: 2019-11-25

## 2019-11-25 VITALS
WEIGHT: 122 LBS | HEIGHT: 67 IN | TEMPERATURE: 98.4 F | HEART RATE: 98 BPM | DIASTOLIC BLOOD PRESSURE: 70 MMHG | OXYGEN SATURATION: 98 % | SYSTOLIC BLOOD PRESSURE: 110 MMHG | BODY MASS INDEX: 19.15 KG/M2

## 2019-11-25 DIAGNOSIS — K11.6 RANULA OF FLOOR OF MOUTH: Primary | ICD-10-CM

## 2019-11-25 PROCEDURE — 99213 OFFICE O/P EST LOW 20 MIN: CPT | Performed by: NURSE PRACTITIONER

## 2019-11-25 RX ORDER — INFLUENZA A VIRUS A/SINGAPORE/GP1908/2015 IVR-180A (H1N1) ANTIGEN (PROPIOLACTONE INACTIVATED), INFLUENZA A VIRUS A/SINGAPORE/INFIMH-16-0019/2016 IVR-186 (H3N2) ANTIGEN (PROPIOLACTONE INACTIVATED), INFLUENZA B VIRUS B/MARYLAND/15/2016 ANTIGEN (PROPIOLACTONE INACTIVATED), AND INFLUENZA B VIRUS B/PHUKET/3073/2013 BVR-1B ANTIGEN (PROPIOLACTONE INACTIVATED) 15; 15; 15; 15 UG/.5ML; UG/.5ML; UG/.5ML; UG/.5ML
INJECTION, SUSPENSION INTRAMUSCULAR
COMMUNITY
Start: 2019-11-19

## 2019-12-02 ENCOUNTER — OFFICE VISIT (OUTPATIENT)
Dept: FAMILY MEDICINE CLINIC | Facility: CLINIC | Age: 24
End: 2019-12-02

## 2019-12-02 VITALS
HEIGHT: 67 IN | BODY MASS INDEX: 19.15 KG/M2 | WEIGHT: 122 LBS | DIASTOLIC BLOOD PRESSURE: 60 MMHG | SYSTOLIC BLOOD PRESSURE: 126 MMHG | TEMPERATURE: 98.1 F | HEART RATE: 92 BPM | OXYGEN SATURATION: 98 %

## 2019-12-02 DIAGNOSIS — K11.6 RANULA OF SALIVARY GLAND OF FLOOR OF MOUTH: Primary | ICD-10-CM

## 2019-12-02 PROCEDURE — 99213 OFFICE O/P EST LOW 20 MIN: CPT | Performed by: NURSE PRACTITIONER

## 2019-12-03 ENCOUNTER — TELEPHONE (OUTPATIENT)
Dept: FAMILY MEDICINE CLINIC | Facility: CLINIC | Age: 24
End: 2019-12-03

## 2019-12-03 NOTE — TELEPHONE ENCOUNTER
"General mine sent back the referral and stated.. \"WE DO NOT DO THIS - PLS RE-ROUTE TO CORRECT SPECIALTY\" please have misael correct and let me know.  "

## 2019-12-04 DIAGNOSIS — K11.6 RANULA OF SALIVARY GLAND OF FLOOR OF MOUTH: Primary | ICD-10-CM

## 2019-12-30 ENCOUNTER — TELEPHONE (OUTPATIENT)
Dept: ONCOLOGY | Facility: HOSPITAL | Age: 24
End: 2019-12-30

## 2020-02-11 ENCOUNTER — TELEPHONE (OUTPATIENT)
Dept: ONCOLOGY | Facility: CLINIC | Age: 25
End: 2020-02-11

## 2020-02-26 ENCOUNTER — TELEPHONE (OUTPATIENT)
Dept: ONCOLOGY | Facility: HOSPITAL | Age: 25
End: 2020-02-26

## 2020-02-26 NOTE — TELEPHONE ENCOUNTER
Per Dr. Partida pt will to 10 mg of Xarelto 4 hours prior to flying and 30 minutes prior to long car rides. She will also wear her compression stocking. Script e-scribed to pt's pharmacy.

## 2020-03-03 ENCOUNTER — TELEPHONE (OUTPATIENT)
Dept: ONCOLOGY | Facility: HOSPITAL | Age: 25
End: 2020-03-03

## 2020-03-03 NOTE — TELEPHONE ENCOUNTER
REC'D MESSAGE PER [facility]. THEY SAID THAT PT'S COUPON DOES NOT COVER XARELTO 10MG TABS. IT WILL COVER 15MG TABS. THAT RX WOULD BE FREE. PER DR. KELLEY OK TO CHANGE RX. GAVE [facility] THE APPROVAL AND THEY V/U. TRIED TO CALL PT TO MAKE HER AWARE. L/M.

## 2021-04-16 ENCOUNTER — BULK ORDERING (OUTPATIENT)
Dept: CASE MANAGEMENT | Facility: OTHER | Age: 26
End: 2021-04-16

## 2021-04-16 DIAGNOSIS — Z23 IMMUNIZATION DUE: ICD-10-CM

## 2022-07-27 NOTE — TELEPHONE ENCOUNTER
Caller: Elyssa Santoyo    Relationship: Self    Best call back number: [phone]    Requested Prescriptions:   Requested Prescriptions     Pending Prescriptions Disp Refills   • rivaroxaban (XARELTO) 15 MG tablet 30 tablet 0     Sig: Take 1 tablet by mouth Daily. TAKE 1 TAB 4 HRS PRIOR TO FLIGHT. OR TAKE 30 MIN PRIOR TO LONG CAR RIDE.        Pharmacy where request should be sent: [id number] [address] AT [facility] [phone] [facility] [phone] FX     Additional details provided by patient: PATIENT JUST NEEDS THIS TO BE ABLE TO FLY. SHE HAS NOT SEEN DR. KELLEY SINCE 2018. PLEASE CALL TO ADVISE.    Does the patient have less than a 3 day supply:  [x] Yes  [] No    Oswaldo BUCHANAN Rep   07/27/22 16:20 EDT